# Patient Record
Sex: MALE | Race: WHITE | NOT HISPANIC OR LATINO | Employment: FULL TIME | URBAN - METROPOLITAN AREA
[De-identification: names, ages, dates, MRNs, and addresses within clinical notes are randomized per-mention and may not be internally consistent; named-entity substitution may affect disease eponyms.]

---

## 2022-05-07 PROCEDURE — 99284 EMERGENCY DEPT VISIT MOD MDM: CPT

## 2022-05-08 ENCOUNTER — HOSPITAL ENCOUNTER (EMERGENCY)
Facility: MEDICAL CENTER | Age: 22
End: 2022-05-08
Attending: EMERGENCY MEDICINE
Payer: COMMERCIAL

## 2022-05-08 VITALS
OXYGEN SATURATION: 94 % | DIASTOLIC BLOOD PRESSURE: 74 MMHG | TEMPERATURE: 96.8 F | HEART RATE: 85 BPM | WEIGHT: 140 LBS | BODY MASS INDEX: 20.73 KG/M2 | RESPIRATION RATE: 14 BRPM | SYSTOLIC BLOOD PRESSURE: 119 MMHG | HEIGHT: 69 IN

## 2022-05-08 DIAGNOSIS — R41.82 ALTERED MENTAL STATUS, UNSPECIFIED ALTERED MENTAL STATUS TYPE: Primary | ICD-10-CM

## 2022-05-08 DIAGNOSIS — F10.920 ALCOHOLIC INTOXICATION WITHOUT COMPLICATION (HCC): ICD-10-CM

## 2022-05-08 DIAGNOSIS — T40.711A ACCIDENTAL CANNABIS OVERDOSE, INITIAL ENCOUNTER: ICD-10-CM

## 2022-05-08 PROCEDURE — 96374 THER/PROPH/DIAG INJ IV PUSH: CPT

## 2022-05-08 PROCEDURE — 700111 HCHG RX REV CODE 636 W/ 250 OVERRIDE (IP): Performed by: EMERGENCY MEDICINE

## 2022-05-08 RX ORDER — ONDANSETRON 2 MG/ML
4 INJECTION INTRAMUSCULAR; INTRAVENOUS ONCE
Status: COMPLETED | OUTPATIENT
Start: 2022-05-08 | End: 2022-05-08

## 2022-05-08 RX ADMIN — ONDANSETRON 4 MG: 2 INJECTION INTRAMUSCULAR; INTRAVENOUS at 01:15

## 2022-05-08 NOTE — ED NOTES
"Pt discharged home with friends. IV discontinued and gauze placed, pt in possession of belongings. Pt provided discharge education and information pertaining to medications and follow up appointments. Pt received copy of discharge instructions and verbalized understanding. /74   Pulse 85   Temp 36 °C (96.8 °F) (Temporal)   Resp 14   Ht 1.753 m (5' 9\")   Wt 63.5 kg (140 lb)   SpO2 94%   BMI 20.67 kg/m²   "

## 2022-05-08 NOTE — ED NOTES
Pt awakes up to verbal stimuli, able to hold conversation and walk to the bathroom with steady gait and no assistance.

## 2022-05-08 NOTE — ED NOTES
Med rec completed per patient  Allergies reviewed  No PO Antibiotics in the last 30 days     Patient states he does not take any medications, RX or OTC    Patient states he does not have a preferred pharmacy, [facility] selected

## 2022-05-08 NOTE — ED NOTES
Pt resting comfortably in bed, respirations even/unlabored. No acute distress noted at this time.

## 2022-05-08 NOTE — ED PROVIDER NOTES
"ED Provider Note      Means of Arrival: EMS  History obtained from: Patient, EMS.  History limited by altered mental status    CHIEF COMPLAINT  Chief Complaint   Patient presents with   • Drug Ingestion     Per EMS pt was at a friends house tonight and took a dab and got too high. Pt also had about 4 shots of alcohol per pts friends. Pt is lethargic and slow to respond to questions, however is A&Ox4 upon assessment.        HPI  Ronal Broussard is a 21 y.o. male who presents with altered mental status.  EMS reports that the patient drink alcohol and use marijuana and became altered.  EMS was called and the patient was transported.  He denies any trauma or pains.  Patient does endorse the story above.    REVIEW OF SYSTEMS  CONSTITUTIONAL:  No fever.  CARDIOVASCULAR:  No chest discomfort.  RESPIRATORY:  No pleuritic chest pain.  GASTROINTESTINAL:  No abdominal pain.  GENITOURINARY:   No dysuria.  See HPI for further details.   All other systems are negative.     PAST MEDICAL HISTORY  No past medical history on file.    FAMILY HISTORY  No family history on file.   Unable to obtain due to altered mental status.    SOCIAL HISTORY       SURGICAL HISTORY  No past surgical history on file.    CURRENT MEDICATIONS  Home Medications     Reviewed by Diony Friedman (Pharmacy Tech) on 05/08/22 at 0443  Med List Status: Complete   Medication Last Dose Status        Patient Joaquín Taking any Medications                       ALLERGIES  No Known Allergies    PHYSICAL EXAM  VITAL SIGNS: /74   Pulse 85   Temp 36 °C (96.8 °F) (Temporal)   Resp 14   Ht 1.753 m (5' 9\")   Wt 63.5 kg (140 lb)   SpO2 94%   BMI 20.67 kg/m²    Gen: Somnolent, alerts to tactile stimulation  HENT: ATNC  Eyes: Normal conjunctiva  Neck: trachea midline  Resp: no respiratory distress  CV: No JVD, RRR  Abd: non-distended  Ext: No deformities  Neuro: speech slurred, moves all extremities        COURSE & MEDICAL DECISION MAKING  Pertinent Labs & " Imaging studies reviewed. (See chart for details)    12:56 AM  Patient admitted to ED observation at this time for ongoing monitoring, sober reevaluation.     0120 patient reevaluated.  Sleeping comfortably, no respiratory distress, breathing easily.    0330 patient reevaluated, sleeping comfortably.  No respiratory distress.  Breathing easily.    6:19 AM  Patient reevaluated.  He now is alert, speaking clearly, able to ambulate.  We will plan for discharge.    6:19 AM  Patient discharged from ED observation at this time in stable condition.    The patient was given return precautions, anticipatory guidance, and the opportunity to ask questions prior to discharge.     Appropriate PPE were worn at this encounter.     FINAL IMPRESSION  1. Altered mental status, unspecified altered mental status type    2. Accidental cannabis overdose, initial encounter    3. Alcoholic intoxication without complication (HCC)           DISPOSITION:  Patient will be discharged home in stable condition.    FOLLOW UP:  [facility], Emergency Dept  [address] [id number] [phone]    If symptoms worsen    To establish a primary care provider within our system, please call [phone]            This dictation was created using voice recognition software. The accuracy of the dictation is limited to the abilities of the software. I expect there may be some errors of grammar and possibly content. The nursing notes were reviewed and certain aspects of this information were incorporated into this note.

## 2022-05-08 NOTE — ED TRIAGE NOTES
"Chief Complaint   Patient presents with   • Drug Ingestion     Per EMS pt was at a friends house tonight and took a dab and got too high. Pt also had about 4 shots of alcohol per pts friends. Pt is lethargic and slow to respond to questions, however is A&Ox4 upon assessment.        22 yo male to triage for above complaint. Pt currently vomiting.     Pt unable to sit up straight in wheelchair. Pt roomed to green [facility] H from triage.     /79   Pulse (!) 121   Temp 35.8 °C (96.5 °F) (Temporal)   Resp 18   Ht 1.753 m (5' 9\")   Wt 63.5 kg (140 lb)   SpO2 94% Comment: RA  BMI 20.67 kg/m²     "